# Patient Record
Sex: FEMALE | Race: WHITE
[De-identification: names, ages, dates, MRNs, and addresses within clinical notes are randomized per-mention and may not be internally consistent; named-entity substitution may affect disease eponyms.]

---

## 2018-10-25 ENCOUNTER — HOSPITAL ENCOUNTER (OUTPATIENT)
Dept: HOSPITAL 74 - FASU | Age: 28
Discharge: HOME | End: 2018-10-25
Attending: INTERNAL MEDICINE
Payer: COMMERCIAL

## 2018-10-25 VITALS — BODY MASS INDEX: 33 KG/M2

## 2018-10-25 VITALS — HEART RATE: 82 BPM | SYSTOLIC BLOOD PRESSURE: 126 MMHG | DIASTOLIC BLOOD PRESSURE: 72 MMHG

## 2018-10-25 VITALS — TEMPERATURE: 97.8 F

## 2018-10-25 DIAGNOSIS — R12: ICD-10-CM

## 2018-10-25 DIAGNOSIS — K44.9: Primary | ICD-10-CM

## 2018-10-25 DIAGNOSIS — K29.50: ICD-10-CM

## 2018-10-25 PROCEDURE — 0DB68ZX EXCISION OF STOMACH, VIA NATURAL OR ARTIFICIAL OPENING ENDOSCOPIC, DIAGNOSTIC: ICD-10-PCS | Performed by: INTERNAL MEDICINE

## 2018-10-25 PROCEDURE — 0DB98ZX EXCISION OF DUODENUM, VIA NATURAL OR ARTIFICIAL OPENING ENDOSCOPIC, DIAGNOSTIC: ICD-10-PCS | Performed by: INTERNAL MEDICINE

## 2018-10-29 NOTE — PATH
Surgical Pathology Report



Patient Name:  AVELINA LEACH

Accession #:  [id number]

[facility]. Rec. #:  [id number]                                                        

   /Age/Gender:  1990 (Age: 28) / F

Account:  [id number]                                                          

             Location: [facility] AMBULATORY 

Taken:  10/25/2018

Received:  10/25/2018

Reported:  10/29/2018

Physicians:  Bjorn Martinez M.D.

  



Specimen(s) Received

A: BX DUODENUM 

B: BX ANTRUM 





Clinical History

GERD

Postoperative diagnosis: Rule out celiac disease, rule out H. Pylori







Final Diagnosis

A. DUODENUM, BIOPSY:

DUODENAL MUCOSA WITHOUT SIGNIFICANT PATHOLOGIC FINDINGS.



B. STOMACH, ANTRUM, BIOPSY:

GASTRIC ANTRAL MUCOSA WITH MODERATE CHRONIC GASTRITIS. IMMUNOHISTOCHEMICAL STAIN

FOR H. PYLORI IS NEGATIVE.







***Electronically Signed***

Radha Zaragoza M.D.





Gross Description

A.  Received in formalin, labeled "duodenum" are 3 tan, irregular portions of

soft tissue ranging from 0.3-0.4 cm. in greatest dimension. The specimens are

submitted in toto in one cassette.



B.  Received in formalin, labeled "antrum" are 2 tan, irregular portions of soft

tissue measuring 0.4 and 0.5 cm. in greatest dimension. The specimens are

submitted in toto in one cassette.



saudi/10/25/2018